# Patient Record
Sex: MALE | Race: WHITE | HISPANIC OR LATINO | Employment: PART TIME | URBAN - METROPOLITAN AREA
[De-identification: names, ages, dates, MRNs, and addresses within clinical notes are randomized per-mention and may not be internally consistent; named-entity substitution may affect disease eponyms.]

---

## 2017-12-05 ENCOUNTER — HOSPITAL ENCOUNTER (EMERGENCY)
Facility: MEDICAL CENTER | Age: 27
End: 2017-12-05
Attending: EMERGENCY MEDICINE

## 2017-12-05 VITALS
DIASTOLIC BLOOD PRESSURE: 73 MMHG | WEIGHT: 187.61 LBS | TEMPERATURE: 98.9 F | HEIGHT: 66 IN | RESPIRATION RATE: 16 BRPM | OXYGEN SATURATION: 98 % | SYSTOLIC BLOOD PRESSURE: 126 MMHG | HEART RATE: 97 BPM | BODY MASS INDEX: 30.15 KG/M2

## 2017-12-05 DIAGNOSIS — J06.9 UPPER RESPIRATORY TRACT INFECTION, UNSPECIFIED TYPE: ICD-10-CM

## 2017-12-05 LAB
DEPRECATED S PYO AG THROAT QL EIA: NORMAL
FLUAV RNA SPEC QL NAA+PROBE: NEGATIVE
FLUBV RNA SPEC QL NAA+PROBE: NEGATIVE
SIGNIFICANT IND 70042: NORMAL
SITE SITE: NORMAL
SOURCE SOURCE: NORMAL

## 2017-12-05 PROCEDURE — 87081 CULTURE SCREEN ONLY: CPT

## 2017-12-05 PROCEDURE — 87880 STREP A ASSAY W/OPTIC: CPT

## 2017-12-05 PROCEDURE — 87502 INFLUENZA DNA AMP PROBE: CPT

## 2017-12-05 PROCEDURE — A9270 NON-COVERED ITEM OR SERVICE: HCPCS | Performed by: EMERGENCY MEDICINE

## 2017-12-05 PROCEDURE — 700102 HCHG RX REV CODE 250 W/ 637 OVERRIDE(OP): Performed by: EMERGENCY MEDICINE

## 2017-12-05 PROCEDURE — 99283 EMERGENCY DEPT VISIT LOW MDM: CPT

## 2017-12-05 RX ORDER — IBUPROFEN 600 MG/1
600 TABLET ORAL ONCE
Status: COMPLETED | OUTPATIENT
Start: 2017-12-05 | End: 2017-12-05

## 2017-12-05 RX ADMIN — IBUPROFEN 600 MG: 600 TABLET, FILM COATED ORAL at 15:11

## 2017-12-05 NOTE — ED NOTES
Chief Complaint   Patient presents with   • Sore Throat     sx for 1 week   • Cough     Pt states flu like sx for 1 week

## 2017-12-06 NOTE — ED PROVIDER NOTES
"ED Provider Note    CHIEF COMPLAINT  Chief Complaint   Patient presents with   • Sore Throat     sx for 1 week   • Cough       HPI  Juanito Leon is a 27 y.o. male who presents To the ER today with sore throat, runny nose, cough and achiness.  Patient states symptoms started today when he was at work.  States he felt a little bit achy, but this worsened throughout the course the day and presented then felt very achy.  He has a runny nose and complains of copious clear rhinorrhea.  He has a bit of a cough.  No chest pain or shortness of breath.  Denies any headache or neck pain.  Also has a sore throat.  Mostly started today.  Denies any other acute concerns or complaints.    REVIEW OF SYSTEMS  See HPI for further details. All other systems are negative.    PAST MEDICAL HISTORY  No past medical history on file.    FAMILY HISTORY  No family history on file.    SOCIAL HISTORY  Social History     Social History   • Marital status: Single     Spouse name: N/A   • Number of children: N/A   • Years of education: N/A     Social History Main Topics   • Smoking status: Not on file   • Smokeless tobacco: Not on file   • Alcohol use Not on file   • Drug use: Unknown   • Sexual activity: Not on file     Other Topics Concern   • Not on file     Social History Narrative   • No narrative on file       SURGICAL HISTORY  No past surgical history on file.    CURRENT MEDICATIONS  Home Medications    **Home medications have not yet been reviewed for this encounter**         ALLERGIES  No Known Allergies    PHYSICAL EXAM  VITAL SIGNS: /73   Pulse 97   Temp 37.2 °C (98.9 °F)   Resp 16   Ht 1.676 m (5' 6\")   Wt 85.1 kg (187 lb 9.8 oz)   SpO2 98%   BMI 30.28 kg/m²    Constitutional: Well developed, Well nourished, No acute distress, Non-toxic appearance.   HENT: Normocephalic, Atraumatic, Bilateral external ears normal, Oropharynx moist, No oral exudates, Nose normal.   Eyes: PERRL, EOMI, Conjunctiva normal, No " discharge.   Neck: Normal range of motion, No tenderness, Supple, No stridor. No cervical lymphadenopathy.  Cardiovascular: Normal heart rate, Normal rhythm, No murmurs, No rubs, No gallops.   Thorax & Lungs: Normal breath sounds, No respiratory distress, No wheezing, No chest tenderness.   Abdomen: Bowel sounds normal, Soft, No tenderness,  Musculoskeletal: Good range of motion in all major joints. No tenderness to palpation or major deformities noted.   Neurologic: Alert & oriented x 3, focal deficits noted.   Psychiatric: Affect normal,    RADIOLOGY/PROCEDURES  none*    COURSE & MEDICAL DECISION MAKING  Pertinent Labs & Imaging studies reviewed. (See chart for details)  The patient presents to ER with sore throat, runny nose, cough and achiness.  Symptoms suggestive of influenza.  Symptoms reported to me are acute.  Nurse's notes have this week.  I discussed possibility Tamiflu with the patient and before prescribing this.  I'll get a flu swab.  This was negative.  Clinically the patient does not have strep pharyngitis, strep test is also negative.  His lungs are clear.  He doesn't have a significant cough.  He is not dyspneic, is not tachycardic or hypoxemic.  Don't think he needs a chest x-ray.  His belly is benign.  We'll treat this with supportive care as a viral illness.  He is to rest, drink plenty of fluids.  Take Tylenol, ibuprofen for pain and achiness.  Return to the ER for worsening symptoms or other concerns.      I went back to reassess the patient.  Given the above plan and discharge instructions and follow-up instructions, and he was gone.  He apparently told the nurse she did not want to wait to see me or talk to me.  He wanted to leave.  I was not made aware that he was unhappy with the duration of his care.  The results of just come back.  Nobody called me.  I found out he has eloped from the ER prior to completion of his evaluation.    FINAL IMPRESSION  1. Upper respiratory tract infection,  unspecified type        2.   3.         Electronically signed by: Raheem Birch, 12/5/2017 4:56 PM

## 2017-12-06 NOTE — ED NOTES
"Pt came out of room stating \"I can't wait for the doctor anymore, I came here for meds and if he aint giving them to me I am leaving.\".  Explained to pt that his results are back and the doctor needs to review them so he can discharge him.  Pt states he will not wait, refused to sign paperwork  "

## 2017-12-07 LAB
S PYO SPEC QL CULT: NORMAL
SIGNIFICANT IND 70042: NORMAL
SITE SITE: NORMAL
SOURCE SOURCE: NORMAL